# Patient Record
Sex: FEMALE | Race: WHITE | NOT HISPANIC OR LATINO | Employment: OTHER | URBAN - METROPOLITAN AREA
[De-identification: names, ages, dates, MRNs, and addresses within clinical notes are randomized per-mention and may not be internally consistent; named-entity substitution may affect disease eponyms.]

---

## 2019-12-12 ENCOUNTER — LAB (OUTPATIENT)
Dept: FAMILY MEDICINE CLINIC | Facility: CLINIC | Age: 59
End: 2019-12-12

## 2019-12-12 ENCOUNTER — OFFICE VISIT (OUTPATIENT)
Dept: FAMILY MEDICINE CLINIC | Facility: CLINIC | Age: 59
End: 2019-12-12

## 2019-12-12 VITALS
BODY MASS INDEX: 26.29 KG/M2 | DIASTOLIC BLOOD PRESSURE: 79 MMHG | HEIGHT: 64 IN | SYSTOLIC BLOOD PRESSURE: 115 MMHG | OXYGEN SATURATION: 99 % | WEIGHT: 154 LBS | HEART RATE: 90 BPM

## 2019-12-12 DIAGNOSIS — E61.1 IRON DEFICIENCY: ICD-10-CM

## 2019-12-12 DIAGNOSIS — I10 ESSENTIAL HYPERTENSION: ICD-10-CM

## 2019-12-12 DIAGNOSIS — G89.29 CHRONIC BILATERAL LOW BACK PAIN WITHOUT SCIATICA: ICD-10-CM

## 2019-12-12 DIAGNOSIS — M54.50 CHRONIC BILATERAL LOW BACK PAIN WITHOUT SCIATICA: ICD-10-CM

## 2019-12-12 DIAGNOSIS — I10 ESSENTIAL HYPERTENSION: Primary | ICD-10-CM

## 2019-12-12 PROBLEM — Z99.89 USE OF CANE AS AMBULATORY AID: Status: ACTIVE | Noted: 2019-12-12

## 2019-12-12 PROBLEM — J45.909 ASTHMA: Status: ACTIVE | Noted: 2019-12-12

## 2019-12-12 PROBLEM — F41.9 ANXIETY: Status: ACTIVE | Noted: 2019-12-12

## 2019-12-12 PROBLEM — L30.9 ECZEMA: Status: ACTIVE | Noted: 2019-12-12

## 2019-12-12 PROBLEM — Z12.39 BREAST CANCER SCREENING: Status: ACTIVE | Noted: 2019-09-12

## 2019-12-12 PROBLEM — J30.2 SEASONAL ALLERGIES: Status: ACTIVE | Noted: 2019-12-12

## 2019-12-12 PROBLEM — Z80.3 FAMILY HISTORY OF MALIGNANT NEOPLASM OF BREAST: Status: ACTIVE | Noted: 2019-03-22

## 2019-12-12 PROBLEM — R06.83 SNORING: Status: ACTIVE | Noted: 2019-12-12

## 2019-12-12 PROBLEM — F32.A DEPRESSION: Status: ACTIVE | Noted: 2019-12-12

## 2019-12-12 PROBLEM — R20.2 PARESTHESIAS IN LEFT HAND: Status: ACTIVE | Noted: 2018-08-16

## 2019-12-12 PROBLEM — G47.00 INSOMNIA: Status: ACTIVE | Noted: 2019-12-12

## 2019-12-12 PROBLEM — M54.30 SCIATICA: Status: ACTIVE | Noted: 2019-12-12

## 2019-12-12 PROBLEM — F81.9 LEARNING DISABILITY: Status: ACTIVE | Noted: 2019-12-12

## 2019-12-12 PROBLEM — G56.03 CARPAL TUNNEL SYNDROME, BILATERAL: Status: ACTIVE | Noted: 2018-10-16

## 2019-12-12 PROBLEM — M25.50 JOINT PAIN: Status: ACTIVE | Noted: 2018-10-16

## 2019-12-12 PROBLEM — D64.9 ANEMIA: Status: ACTIVE | Noted: 2019-12-12

## 2019-12-12 PROBLEM — H53.9 VISION ABNORMALITIES: Status: ACTIVE | Noted: 2019-12-12

## 2019-12-12 LAB
ALBUMIN SERPL-MCNC: 4.4 G/DL (ref 3.5–5.2)
ALBUMIN/GLOB SERPL: 1.7 G/DL
ALP SERPL-CCNC: 77 U/L (ref 39–117)
ALT SERPL W P-5'-P-CCNC: 19 U/L (ref 1–33)
ANION GAP SERPL CALCULATED.3IONS-SCNC: 14.1 MMOL/L (ref 5–15)
ANISOCYTOSIS BLD QL: ABNORMAL
AST SERPL-CCNC: 15 U/L (ref 1–32)
BASOPHILS # BLD MANUAL: 0.1 10*3/MM3 (ref 0–0.2)
BASOPHILS NFR BLD AUTO: 1.9 % (ref 0–1.5)
BILIRUB SERPL-MCNC: 0.2 MG/DL (ref 0.2–1.2)
BUN BLD-MCNC: 9 MG/DL (ref 8–23)
BUN/CREAT SERPL: 14.8 (ref 7–25)
CALCIUM SPEC-SCNC: 10.1 MG/DL (ref 8.6–10.5)
CHLORIDE SERPL-SCNC: 102 MMOL/L (ref 98–107)
CHOLEST SERPL-MCNC: 156 MG/DL (ref 0–200)
CO2 SERPL-SCNC: 27.9 MMOL/L (ref 22–29)
CREAT BLD-MCNC: 0.61 MG/DL (ref 0.57–1)
DEPRECATED RDW RBC AUTO: 36.7 FL (ref 37–54)
ELLIPTOCYTES BLD QL SMEAR: ABNORMAL
EOSINOPHIL # BLD MANUAL: 0.05 10*3/MM3 (ref 0–0.4)
EOSINOPHIL NFR BLD MANUAL: 0.9 % (ref 0.3–6.2)
ERYTHROCYTE [DISTWIDTH] IN BLOOD BY AUTOMATED COUNT: 15.2 % (ref 12.3–15.4)
GFR SERPL CREATININE-BSD FRML MDRD: 100 ML/MIN/1.73
GLOBULIN UR ELPH-MCNC: 2.6 GM/DL
GLUCOSE BLD-MCNC: 85 MG/DL (ref 65–99)
HCT VFR BLD AUTO: 38.8 % (ref 34–46.6)
HDLC SERPL-MCNC: 54 MG/DL (ref 40–60)
HGB BLD-MCNC: 12 G/DL (ref 12–15.9)
IRON 24H UR-MRATE: 55 MCG/DL (ref 37–145)
IRON SATN MFR SERPL: 18 % (ref 20–50)
LDLC SERPL CALC-MCNC: 74 MG/DL (ref 0–100)
LDLC/HDLC SERPL: 1.37 {RATIO}
LYMPHOCYTES # BLD MANUAL: 1.7 10*3/MM3 (ref 0.7–3.1)
LYMPHOCYTES NFR BLD MANUAL: 33 % (ref 19.6–45.3)
LYMPHOCYTES NFR BLD MANUAL: 5.7 % (ref 5–12)
MCH RBC QN AUTO: 21.3 PG (ref 26.6–33)
MCHC RBC AUTO-ENTMCNC: 30.9 G/DL (ref 31.5–35.7)
MCV RBC AUTO: 68.9 FL (ref 79–97)
MICROCYTES BLD QL: ABNORMAL
MONOCYTES # BLD AUTO: 0.29 10*3/MM3 (ref 0.1–0.9)
NEUTROPHILS # BLD AUTO: 3.02 10*3/MM3 (ref 1.7–7)
NEUTROPHILS NFR BLD MANUAL: 58.5 % (ref 42.7–76)
NRBC SPEC MANUAL: 0.9 /100 WBC (ref 0–0.2)
PLAT MORPH BLD: NORMAL
PLATELET # BLD AUTO: 210 10*3/MM3 (ref 140–450)
PMV BLD AUTO: 11 FL (ref 6–12)
POIKILOCYTOSIS BLD QL SMEAR: ABNORMAL
POTASSIUM BLD-SCNC: 3.6 MMOL/L (ref 3.5–5.2)
PROT SERPL-MCNC: 7 G/DL (ref 6–8.5)
RBC # BLD AUTO: 5.63 10*6/MM3 (ref 3.77–5.28)
SODIUM BLD-SCNC: 144 MMOL/L (ref 136–145)
TIBC SERPL-MCNC: 313 MCG/DL (ref 298–536)
TRANSFERRIN SERPL-MCNC: 210 MG/DL (ref 200–360)
TRIGL SERPL-MCNC: 141 MG/DL (ref 0–150)
VLDLC SERPL-MCNC: 28.2 MG/DL (ref 5–40)
WBC MORPH BLD: NORMAL
WBC NRBC COR # BLD: 5.16 10*3/MM3 (ref 3.4–10.8)

## 2019-12-12 PROCEDURE — 83540 ASSAY OF IRON: CPT | Performed by: NURSE PRACTITIONER

## 2019-12-12 PROCEDURE — 85007 BL SMEAR W/DIFF WBC COUNT: CPT | Performed by: NURSE PRACTITIONER

## 2019-12-12 PROCEDURE — 99204 OFFICE O/P NEW MOD 45 MIN: CPT | Performed by: NURSE PRACTITIONER

## 2019-12-12 PROCEDURE — 85025 COMPLETE CBC W/AUTO DIFF WBC: CPT | Performed by: NURSE PRACTITIONER

## 2019-12-12 PROCEDURE — 80061 LIPID PANEL: CPT | Performed by: NURSE PRACTITIONER

## 2019-12-12 PROCEDURE — 36415 COLL VENOUS BLD VENIPUNCTURE: CPT

## 2019-12-12 PROCEDURE — 80053 COMPREHEN METABOLIC PANEL: CPT | Performed by: NURSE PRACTITIONER

## 2019-12-12 PROCEDURE — 84466 ASSAY OF TRANSFERRIN: CPT | Performed by: NURSE PRACTITIONER

## 2019-12-12 RX ORDER — CYCLOBENZAPRINE HCL 10 MG
10 TABLET ORAL 3 TIMES DAILY PRN
Qty: 90 TABLET | Refills: 2 | Status: SHIPPED | OUTPATIENT
Start: 2019-12-12 | End: 2020-08-18 | Stop reason: SDUPTHER

## 2019-12-12 RX ORDER — TRAMADOL HYDROCHLORIDE 50 MG/1
50 TABLET ORAL EVERY 6 HOURS PRN
COMMUNITY

## 2019-12-12 RX ORDER — LISINOPRIL 10 MG/1
10 TABLET ORAL DAILY
Qty: 90 TABLET | Refills: 1 | Status: SHIPPED | OUTPATIENT
Start: 2019-12-12 | End: 2020-06-15 | Stop reason: SDUPTHER

## 2019-12-12 RX ORDER — EPINEPHRINE 0.3 MG/.3ML
INJECTION SUBCUTANEOUS
COMMUNITY
Start: 2017-08-04

## 2019-12-12 RX ORDER — DICLOFENAC SODIUM 75 MG/1
TABLET, DELAYED RELEASE ORAL
Qty: 180 TABLET | Refills: 0 | Status: SHIPPED | OUTPATIENT
Start: 2019-12-12 | End: 2020-08-18 | Stop reason: SDUPTHER

## 2019-12-12 RX ORDER — ALBUTEROL SULFATE 90 UG/1
2 AEROSOL, METERED RESPIRATORY (INHALATION) EVERY 4 HOURS PRN
COMMUNITY
Start: 2018-08-16

## 2019-12-12 RX ORDER — FERROUS SULFATE 325(65) MG
325 TABLET ORAL DAILY
COMMUNITY
Start: 2019-04-09

## 2019-12-12 RX ORDER — DICLOFENAC SODIUM 75 MG/1
1 TABLET, DELAYED RELEASE ORAL 2 TIMES DAILY
COMMUNITY
Start: 2019-07-18 | End: 2019-12-12 | Stop reason: SDUPTHER

## 2019-12-12 RX ORDER — GABAPENTIN 400 MG/1
1 CAPSULE ORAL 3 TIMES DAILY
COMMUNITY
Start: 2019-08-01 | End: 2019-12-12

## 2019-12-12 RX ORDER — FLUTICASONE PROPIONATE 50 MCG
2 SPRAY, SUSPENSION (ML) NASAL DAILY
COMMUNITY
Start: 2019-04-09

## 2019-12-12 RX ORDER — DICLOFENAC SODIUM 75 MG/1
75 TABLET, DELAYED RELEASE ORAL 2 TIMES DAILY
Qty: 60 TABLET | Refills: 5 | Status: SHIPPED | OUTPATIENT
Start: 2019-12-12 | End: 2019-12-12 | Stop reason: SDUPTHER

## 2019-12-12 RX ORDER — LISINOPRIL 10 MG/1
10 TABLET ORAL DAILY
COMMUNITY
Start: 2019-04-09 | End: 2019-12-12 | Stop reason: SDUPTHER

## 2019-12-12 RX ORDER — CYCLOBENZAPRINE HCL 10 MG
10 TABLET ORAL
COMMUNITY
Start: 2019-08-08 | End: 2019-12-12 | Stop reason: SDUPTHER

## 2019-12-12 RX ORDER — CETIRIZINE HYDROCHLORIDE 5 MG/1
5 TABLET ORAL DAILY
COMMUNITY

## 2019-12-12 RX ORDER — GABAPENTIN 100 MG/1
100 CAPSULE ORAL 3 TIMES DAILY
Qty: 120 CAPSULE | Refills: 1 | Status: SHIPPED | OUTPATIENT
Start: 2019-12-12

## 2019-12-12 NOTE — PROGRESS NOTES
Subjective   Kaley Rocha is a 59 y.o. female.     Pt is here today to establish care and with c/o fibromyalgia and osteoarthritis.  Pt is from [place] and recently moves to [place].  Previous PCP was Dr. Macedo and Dr. Malone in [place].  She had been established with pain management in the past- Dr. Rowell Learner on [address].  She has an appointment with Pain Management in January but lost the information as to who it was with.  Pt does not work at this time- she is disabled d/t fibromyalgia and osteoarthritis.  She is single, with a good support system.  She tries to get some physical activity in by walking, but doesn't as much as she believes she would.  She tries to eat a well balanced diet.    HTN- currently on 10mg lisinopril daily.  Tolerates the medication well without any side effects.  Denies any HA, SOA, dizziness.    Fibromyalgia- was taking gabapentin 400 mg TID and tramadol 50 mg TID.  Has been out of the pain medication for a couple of months. Would like to get back on gabapentin today.  Has appt with pain management in January    Osteoarthritis- Takes diclofenac 75 mg bid, flexeril 10 mg TID.  Most of her pain is in her back and into the lower body.  She has mild-moderate pain daily.  Does not have any aggrevating or alleviating symptoms.    Iron Deficiency anemia- has been oral iron in the past but has not been taking regularly.             Labs- due  Pap smear-utd- sees Dr. Ruben Taylor- has last Dec or Jan  Mammogram- utd   DEXA- due  Colonoscopy- due- last one was over 10 years- refused    Vaccines:  Flu- utd   PNA- not due yet  Shingles- due- refused  Tdap- utd 2018    Dental exam- due  Eye exam- utd    The following portions of the patient's history were reviewed and updated as appropriate: allergies, current medications, past family history, past medical history, past social history, past surgical history and problem list.    Review of Systems   Constitutional: Negative for  "appetite change, chills, fatigue and fever.   HENT: Negative for congestion, ear pain, hearing loss, postnasal drip, rhinorrhea, sinus pressure, sore throat, swollen glands and trouble swallowing.    Eyes: Negative for blurred vision, double vision, pain, discharge, itching and visual disturbance.   Respiratory: Negative for cough, chest tightness, shortness of breath and wheezing.    Cardiovascular: Negative for chest pain, palpitations and leg swelling.   Gastrointestinal: Negative for abdominal pain, blood in stool, constipation, diarrhea, nausea and vomiting.   Endocrine: Negative for polydipsia, polyphagia and polyuria.   Genitourinary: Negative for dysuria, flank pain, frequency and urgency.   Musculoskeletal: Positive for arthralgias, back pain and myalgias.   Skin: Negative for rash and skin lesions.   Neurological: Positive for numbness. Negative for dizziness, weakness and headache.   Psychiatric/Behavioral: Positive for sleep disturbance. Negative for depressed mood and stress. The patient is not nervous/anxious.        Objective   /79 (BP Location: Left arm, Patient Position: Sitting, Cuff Size: Adult)   Pulse 90   Ht 163.1 cm (64.2\")   Wt 69.9 kg (154 lb)   SpO2 99%   BMI 26.27 kg/m²   Physical Exam   Constitutional: She is oriented to person, place, and time. She appears well-developed and well-nourished. No distress.   HENT:   Head: Normocephalic and atraumatic.   Right Ear: External ear normal.   Left Ear: External ear normal.   Mouth/Throat: Oropharynx is clear and moist.   Eyes: Pupils are equal, round, and reactive to light. Conjunctivae and EOM are normal. Right eye exhibits no discharge. Left eye exhibits no discharge.   Neck: Normal range of motion. Neck supple.   Cardiovascular: Normal rate and regular rhythm.   Pulmonary/Chest: Effort normal and breath sounds normal. No respiratory distress. She has no wheezes. She has no rales.   Abdominal: Soft. Normal appearance and bowel sounds " are normal. She exhibits no distension. There is no tenderness.   Musculoskeletal: Normal range of motion.   Neurological: She is alert and oriented to person, place, and time.   Skin: Skin is warm and dry. She is not diaphoretic.   Psychiatric: She has a normal mood and affect. Her behavior is normal. Thought content normal.   Vitals reviewed.        Assessment/Plan     Diagnoses and all orders for this visit:    1. Essential hypertension (Primary)  Comments:  stable, cont lisinopril  Orders:  -     CBC & Differential  -     Comprehensive Metabolic Panel; Future  -     Lipid Panel; Future  -     lisinopril (PRINIVIL,ZESTRIL) 10 MG tablet; Take 1 tablet by mouth Daily.  Dispense: 90 tablet; Refill: 1    2. Chronic bilateral low back pain without sciatica  Comments:  stable, cont current meds  renew diclofenac  referral to PM  flexeril  start low dose of gabapentin TID  Orders:  -     Ambulatory Referral to Pain Management  -     diclofenac (VOLTAREN) 75 MG EC tablet; Take 1 tablet by mouth 2 (Two) Times a Day.  Dispense: 60 tablet; Refill: 5  -     cyclobenzaprine (FLEXERIL) 10 MG tablet; Take 1 tablet by mouth 3 (Three) Times a Day As Needed for Muscle Spasms.  Dispense: 90 tablet; Refill: 2  -     gabapentin (NEURONTIN) 100 MG capsule; Take 1 capsule by mouth 3 (Three) Times a Day.  Dispense: 120 capsule; Refill: 1    3. Iron deficiency  Comments:  check labs  Orders:  -     Iron and TIBC; Future

## 2019-12-12 NOTE — PATIENT INSTRUCTIONS
Follow up with pain management  Obtain labs  Continue current medication  Call for any questions or concerns  Work on diet and increasing exercise

## 2020-06-15 DIAGNOSIS — I10 ESSENTIAL HYPERTENSION: ICD-10-CM

## 2020-06-15 RX ORDER — LISINOPRIL 10 MG/1
10 TABLET ORAL DAILY
Qty: 90 TABLET | Refills: 1 | Status: SHIPPED | OUTPATIENT
Start: 2020-06-15 | End: 2020-07-29

## 2020-07-28 NOTE — PROGRESS NOTES
Subjective   Kaley Rocha is a 60 y.o. female.     Pt is here today to follow up on HTN and chronic pain.  She stays active on most days.    Walks frequently.    HTN- pt is currently taking lisinopril 10mg. Denies any dizziness or HA. BP is slightly low today. Reports that she has decreased stress now, so she believes that is why her BP is improved    Chronic pain - pt has osteoarthritis and fibromyalgia. She was previously referred to pain management. She is seeing a nurse practitioner at [facility].  Pt is on ultram 50mg bid, flexeril 10mg, and gabapentin 100mg tid.    Pts iron was slightly low last visit and she was advised to take an OTC iron supplement. She stopped the iron a couple of months ago.    Labs- due in 6 mo  Pap smear- scheduled in august  Mammogram-scheduled for august  DEXA-  Colonoscopy- had colonoscopy over 10 yrs ago- no polyps.  No 1st degree relative with colon cancer. Would like cologuard    Vaccines:  Flu- N/A  PNA- not due yet  Shingles- due- refused  Tdap- utd 2018    Dental exam- utd  Eye exam- utd         The following portions of the patient's history were reviewed and updated as appropriate: allergies, current medications, past family history, past medical history, past social history, past surgical history and problem list.    Review of Systems   Constitutional: Negative for appetite change, chills, fatigue and fever.   HENT: Negative for congestion, ear pain, hearing loss, postnasal drip, rhinorrhea, sinus pressure, sore throat, swollen glands and trouble swallowing.    Eyes: Negative for blurred vision, double vision, pain, discharge, itching and visual disturbance.   Respiratory: Negative for cough, chest tightness, shortness of breath and wheezing.    Cardiovascular: Negative for chest pain and palpitations.   Gastrointestinal: Negative for abdominal pain, blood in stool, constipation, diarrhea, nausea and vomiting.   Endocrine: Negative for polydipsia, polyphagia and  "polyuria.   Genitourinary: Negative for dysuria, flank pain, frequency and urgency.   Musculoskeletal: Positive for arthralgias and back pain. Negative for myalgias.   Skin: Negative for rash and skin lesions.   Neurological: Negative for dizziness, weakness, numbness and headache.   Psychiatric/Behavioral: Negative for sleep disturbance, suicidal ideas, depressed mood and stress. The patient is not nervous/anxious.        Objective   BP 98/62 (BP Location: Left arm, Patient Position: Sitting, Cuff Size: Adult)   Pulse 80   Temp 97.5 °F (36.4 °C) (Temporal)   Resp 16   Ht 163.1 cm (64.2\")   Wt 70.1 kg (154 lb 9.6 oz)   SpO2 100%   Breastfeeding No   BMI 26.37 kg/m²   Physical Exam   Constitutional: She is oriented to person, place, and time. She appears well-developed and well-nourished. No distress.   HENT:   Head: Normocephalic and atraumatic.   Eyes: Pupils are equal, round, and reactive to light. Conjunctivae and EOM are normal. Right eye exhibits no discharge. Left eye exhibits no discharge.   Neck: Normal range of motion. Neck supple.   Cardiovascular: Normal rate and regular rhythm.   Pulmonary/Chest: Effort normal and breath sounds normal. No respiratory distress. She has no wheezes. She has no rales.   Abdominal: Soft. Normal appearance and bowel sounds are normal. She exhibits no distension. There is no tenderness.   Musculoskeletal: Normal range of motion.   Neurological: She is alert and oriented to person, place, and time.   Skin: Skin is warm and dry. She is not diaphoretic.   Psychiatric: She has a normal mood and affect. Her behavior is normal. Thought content normal.   Vitals reviewed.        Assessment/Plan     Diagnoses and all orders for this visit:    1. Essential hypertension (Primary)  Comments:  BP low today  decrease lisinopril to 5mg daily  goal is 120/80  denies dizziness or HA    2. Chronic bilateral low back pain without sciatica  Comments:  stable  sees pain management    3. " Colon cancer screening  Comments:  cologuard is ordered    Orders:  -     Cologuard - Stool, Per Rectum; Future    Other orders  -     lisinopril (PRINIVIL,ZESTRIL) 5 MG tablet; Take 1 tablet by mouth Daily.  Dispense: 90 tablet; Refill: 0

## 2020-07-29 ENCOUNTER — OFFICE VISIT (OUTPATIENT)
Dept: FAMILY MEDICINE CLINIC | Facility: CLINIC | Age: 60
End: 2020-07-29

## 2020-07-29 ENCOUNTER — RESULTS ENCOUNTER (OUTPATIENT)
Dept: FAMILY MEDICINE CLINIC | Facility: CLINIC | Age: 60
End: 2020-07-29

## 2020-07-29 VITALS
TEMPERATURE: 97.5 F | BODY MASS INDEX: 26.4 KG/M2 | RESPIRATION RATE: 16 BRPM | WEIGHT: 154.6 LBS | DIASTOLIC BLOOD PRESSURE: 62 MMHG | HEART RATE: 80 BPM | OXYGEN SATURATION: 100 % | SYSTOLIC BLOOD PRESSURE: 98 MMHG | HEIGHT: 64 IN

## 2020-07-29 DIAGNOSIS — M54.50 CHRONIC BILATERAL LOW BACK PAIN WITHOUT SCIATICA: ICD-10-CM

## 2020-07-29 DIAGNOSIS — Z12.11 COLON CANCER SCREENING: ICD-10-CM

## 2020-07-29 DIAGNOSIS — G89.29 CHRONIC BILATERAL LOW BACK PAIN WITHOUT SCIATICA: ICD-10-CM

## 2020-07-29 DIAGNOSIS — I10 ESSENTIAL HYPERTENSION: Primary | ICD-10-CM

## 2020-07-29 PROCEDURE — 99213 OFFICE O/P EST LOW 20 MIN: CPT | Performed by: NURSE PRACTITIONER

## 2020-07-29 RX ORDER — LISINOPRIL 5 MG/1
5 TABLET ORAL DAILY
Qty: 90 TABLET | Refills: 0 | Status: SHIPPED | OUTPATIENT
Start: 2020-07-29 | End: 2020-10-12 | Stop reason: SDUPTHER

## 2020-07-29 NOTE — PATIENT INSTRUCTIONS
Decrease lisinopril to 5 mg daily.  Complete cologuard screening  Complete mammogram and pap smear  Follow up in 6 mo for labs  Cont to work on diet and exercise  Call for any issues or concerns

## 2020-08-18 DIAGNOSIS — G89.29 CHRONIC BILATERAL LOW BACK PAIN WITHOUT SCIATICA: ICD-10-CM

## 2020-08-18 DIAGNOSIS — M54.50 CHRONIC BILATERAL LOW BACK PAIN WITHOUT SCIATICA: ICD-10-CM

## 2020-08-18 RX ORDER — CYCLOBENZAPRINE HCL 10 MG
10 TABLET ORAL 3 TIMES DAILY PRN
Qty: 90 TABLET | Refills: 2 | Status: SHIPPED | OUTPATIENT
Start: 2020-08-18 | End: 2020-12-11 | Stop reason: SDUPTHER

## 2020-08-18 RX ORDER — DICLOFENAC SODIUM 75 MG/1
75 TABLET, DELAYED RELEASE ORAL 2 TIMES DAILY
Qty: 180 TABLET | Refills: 0 | Status: SHIPPED | OUTPATIENT
Start: 2020-08-18 | End: 2020-10-12

## 2020-10-12 DIAGNOSIS — G89.29 CHRONIC BILATERAL LOW BACK PAIN WITHOUT SCIATICA: ICD-10-CM

## 2020-10-12 DIAGNOSIS — M54.50 CHRONIC BILATERAL LOW BACK PAIN WITHOUT SCIATICA: ICD-10-CM

## 2020-10-12 RX ORDER — LISINOPRIL 5 MG/1
5 TABLET ORAL DAILY
Qty: 90 TABLET | Refills: 0 | Status: SHIPPED | OUTPATIENT
Start: 2020-10-12 | End: 2020-12-11 | Stop reason: SDUPTHER

## 2020-10-12 RX ORDER — DICLOFENAC SODIUM 75 MG/1
TABLET, DELAYED RELEASE ORAL
Qty: 180 TABLET | Refills: 0 | Status: SHIPPED | OUTPATIENT
Start: 2020-10-12 | End: 2020-12-11 | Stop reason: SDUPTHER

## 2020-12-11 DIAGNOSIS — G89.29 CHRONIC BILATERAL LOW BACK PAIN WITHOUT SCIATICA: ICD-10-CM

## 2020-12-11 DIAGNOSIS — M54.50 CHRONIC BILATERAL LOW BACK PAIN WITHOUT SCIATICA: ICD-10-CM

## 2020-12-11 RX ORDER — DICLOFENAC SODIUM 75 MG/1
75 TABLET, DELAYED RELEASE ORAL 2 TIMES DAILY
Qty: 180 TABLET | Refills: 0 | Status: SHIPPED | OUTPATIENT
Start: 2020-12-11

## 2020-12-11 RX ORDER — CYCLOBENZAPRINE HCL 10 MG
10 TABLET ORAL 3 TIMES DAILY PRN
Qty: 90 TABLET | Refills: 2 | Status: SHIPPED | OUTPATIENT
Start: 2020-12-11 | End: 2022-05-13

## 2020-12-11 RX ORDER — LISINOPRIL 5 MG/1
5 TABLET ORAL DAILY
Qty: 90 TABLET | Refills: 0 | Status: SHIPPED | OUTPATIENT
Start: 2020-12-11 | End: 2021-04-15

## 2021-01-15 ENCOUNTER — TELEPHONE (OUTPATIENT)
Dept: FAMILY MEDICINE CLINIC | Facility: CLINIC | Age: 61
End: 2021-01-15

## 2021-01-15 DIAGNOSIS — M79.642 PAIN IN BOTH HANDS: Primary | ICD-10-CM

## 2021-01-15 DIAGNOSIS — M79.641 PAIN IN BOTH HANDS: Primary | ICD-10-CM

## 2021-04-15 RX ORDER — LISINOPRIL 5 MG/1
5 TABLET ORAL DAILY
Qty: 90 TABLET | Refills: 0 | Status: SHIPPED | OUTPATIENT
Start: 2021-04-15 | End: 2021-05-11 | Stop reason: SDUPTHER

## 2021-05-10 NOTE — PROGRESS NOTES
Subjective   Kaley Rocha is a 61 y.o. female.     Pt is here for medication refills.  She doesn't want her blood pressure medication increased.  She is currently taking 2 of her lisinopril 5mg tablets daily. She has been under a lot of stress with loss of her mother.  h  Last mammogram- Had last year in [facility]  Last pap- 2years ago  Last colon cancer screening-About 20 years ago and doesn't want to do it at this time.  Last labs 12/2019 and were normal other than iron being on low side. Doesn't want to do labs at this time.       The following portions of the patient's history were reviewed and updated as appropriate: allergies, current medications, past family history, past medical history, past social history, past surgical history and problem list.  Past Medical History:   Diagnosis Date   • Arthritis    • Fibromyalgia    • Hypertension      No past surgical history on file.  Family History   Problem Relation Age of Onset   • Cancer Mother    • Breast cancer Mother    • Hypertension Mother    • Hypertension Father    • Diabetes Brother    • Hypertension Brother    • Heart attack Brother    • Cancer Maternal Grandmother    • Cancer Paternal Grandfather      Social History     Socioeconomic History   • Marital status: Single     Spouse name: Not on file   • Number of children: Not on file   • Years of education: Not on file   • Highest education level: Not on file   Tobacco Use   • Smoking status: Former Smoker   • Smokeless tobacco: Never Used   • Tobacco comment: smoked occasionally, but quit 2 yrs ago   Substance and Sexual Activity   • Alcohol use: Yes     Alcohol/week: 2.0 standard drinks     Types: 2 Glasses of wine per week   • Drug use: Never   • Sexual activity: Yes     Partners: Male         Current Outpatient Medications:   •  albuterol sulfate  (90 Base) MCG/ACT inhaler, Inhale 2 puffs Every 4 (Four) Hours As Needed for Wheezing., Disp: , Rfl:   •   "cetirizine (zyrTEC) 5 MG tablet, Take 5 mg by mouth Daily., Disp: , Rfl:   •  cyclobenzaprine (FLEXERIL) 10 MG tablet, Take 1 tablet by mouth 3 (Three) Times a Day As Needed for Muscle Spasms., Disp: 90 tablet, Rfl: 2  •  diclofenac (VOLTAREN) 75 MG EC tablet, Take 1 tablet by mouth 2 (Two) Times a Day., Disp: 180 tablet, Rfl: 0  •  EPINEPHrine (EPIPEN 2-JOSELUIS) 0.3 MG/0.3ML solution auto-injector injection, INJECT 0.3 MG BY INTRAMUSCULAR ROUTE ONCE AS NEEDED FOR ANAPHYLAXIS, DISPENSE TWIN PACK, Disp: , Rfl:   •  ferrous sulfate 325 (65 FE) MG tablet, Take 325 mg by mouth Daily., Disp: , Rfl:   •  fluticasone (FLONASE) 50 MCG/ACT nasal spray, 2 sprays into the nostril(s) as directed by provider Daily., Disp: , Rfl:   •  gabapentin (NEURONTIN) 100 MG capsule, Take 1 capsule by mouth 3 (Three) Times a Day., Disp: 120 capsule, Rfl: 1  •  lisinopril (PRINIVIL,ZESTRIL) 5 MG tablet, TAKE 1 TABLET BY MOUTH DAILY, Disp: 90 tablet, Rfl: 0  •  traMADol (ULTRAM) 50 MG tablet, Take 50 mg by mouth Every 6 (Six) Hours As Needed for Pain., Disp: , Rfl:     Review of Systems   Constitutional: Negative for activity change, appetite change, chills, diaphoresis, fatigue, fever, unexpected weight gain and unexpected weight loss.   Eyes: Negative for blurred vision, double vision and visual disturbance.   Respiratory: Negative for chest tightness and shortness of breath.    Cardiovascular: Negative for chest pain, palpitations and leg swelling.   Neurological: Negative for dizziness, weakness, light-headedness, headache and confusion.   Psychiatric/Behavioral: Positive for depressed mood and stress. Negative for self-injury, sleep disturbance and suicidal ideas. The patient is nervous/anxious.      /84 (BP Location: Left arm, Patient Position: Sitting, Cuff Size: Adult)   Pulse 104   Temp 97.5 °F (36.4 °C) (Skin)   Ht 162.6 cm (64\")   SpO2 96%   BMI 26.54 kg/m²       Objective   Physical Exam  Vitals and nursing note reviewed. "   Constitutional:       General: She is not in acute distress.     Appearance: Normal appearance. She is normal weight.   HENT:      Head: Normocephalic and atraumatic.   Neck:      Thyroid: No thyroid mass, thyromegaly or thyroid tenderness.   Cardiovascular:      Rate and Rhythm: Normal rate and regular rhythm.      Heart sounds: Normal heart sounds. No murmur heard.     Pulmonary:      Effort: Pulmonary effort is normal. No respiratory distress.      Breath sounds: Normal breath sounds. No wheezing, rhonchi or rales.   Musculoskeletal:      Cervical back: Normal range of motion and neck supple.      Right lower leg: No edema.      Left lower leg: No edema.   Skin:     General: Skin is warm and dry.   Neurological:      General: No focal deficit present.      Mental Status: She is alert and oriented to person, place, and time.   Psychiatric:         Mood and Affect: Mood is anxious.         Behavior: Behavior normal.         Thought Content: Thought content normal.         Procedures     Assessment/Plan   Diagnoses and all orders for this visit:    1. Essential hypertension (Primary)  -     lisinopril (PRINIVIL,ZESTRIL) 10 MG tablet; Take 1 tablet by mouth Daily.  Dispense: 90 tablet; Refill: 3      Pt politely declines blood work at this time.  She also doesn't want to do any colon cancer screening tests at this time.  Encouraged pt to work on low sodium diet and continue walking for exercise.  Encouraged therapy or stress reduction also.  She will monitor her blood pressure and let us know if staying elevated. We discussed the risks of having high blood pressure not well controlled.

## 2021-05-11 ENCOUNTER — OFFICE VISIT (OUTPATIENT)
Dept: FAMILY MEDICINE CLINIC | Facility: CLINIC | Age: 61
End: 2021-05-11

## 2021-05-11 VITALS
BODY MASS INDEX: 26.54 KG/M2 | DIASTOLIC BLOOD PRESSURE: 84 MMHG | HEART RATE: 104 BPM | SYSTOLIC BLOOD PRESSURE: 161 MMHG | OXYGEN SATURATION: 96 % | TEMPERATURE: 97.5 F | HEIGHT: 64 IN

## 2021-05-11 DIAGNOSIS — I10 ESSENTIAL HYPERTENSION: Primary | ICD-10-CM

## 2021-05-11 PROCEDURE — 99213 OFFICE O/P EST LOW 20 MIN: CPT | Performed by: PHYSICIAN ASSISTANT

## 2021-05-11 RX ORDER — LISINOPRIL 10 MG/1
10 TABLET ORAL DAILY
Qty: 90 TABLET | Refills: 3 | Status: SHIPPED | OUTPATIENT
Start: 2021-05-11 | End: 2022-06-01 | Stop reason: SDUPTHER

## 2021-05-11 NOTE — PATIENT INSTRUCTIONS
Work on low sodium diet and exercise.  Continue lisinopril.  Will send higher dose of 10mg to pharmacy.  Monitor blood pressure and let us know if staying elevated.

## 2021-09-23 ENCOUNTER — TELEPHONE (OUTPATIENT)
Dept: FAMILY MEDICINE CLINIC | Facility: CLINIC | Age: 61
End: 2021-09-23

## 2021-09-23 NOTE — TELEPHONE ENCOUNTER
I would recommend pfizer as it is now FDA approved. Next would be Moderna and then Philip and Philip. Truly I am fine with any though.

## 2021-09-23 NOTE — TELEPHONE ENCOUNTER
Caller: Kaley Rocha    Relationship: Self    Best call back number: [phone]    Who are you requesting to speak with (clinical staff, provider,  specific staff member): NURSE    What was the call regarding:     PATIENT IS NEEDING TO KNOW WHICH COVID VACCINE IS THE SAFEST.    Do you require a callback:YES

## 2021-09-24 NOTE — TELEPHONE ENCOUNTER
Id say 1000mg of Vit D and whatever the over the counter dose of zinc is. That's not one I typically prescribe

## 2021-09-27 ENCOUNTER — TELEPHONE (OUTPATIENT)
Dept: FAMILY MEDICINE CLINIC | Facility: CLINIC | Age: 61
End: 2021-09-27

## 2021-09-27 NOTE — TELEPHONE ENCOUNTER
Let her know we typically advise waiting until 5 days after exposure before being tested unless she develops symptoms. She ca schedule appt to get tested here, pr she can go to a little clinic or the [address] [place] [address] grounds

## 2021-09-27 NOTE — TELEPHONE ENCOUNTER
PATIENT HAS BEEN EXPOSED TO  COVID AND WANTS TO GET TESTED. I TRIED TO TRANSFER BUT WAS UNSUCCESSFUL. SHE IS NERVOUS BECAUSE SHE HAS A FAMILY MEMBER IN THE HOSPITAL WITH COVID.    PLEASE ADVISE  [phone]

## 2021-12-06 ENCOUNTER — TELEPHONE (OUTPATIENT)
Dept: FAMILY MEDICINE CLINIC | Facility: CLINIC | Age: 61
End: 2021-12-06

## 2021-12-06 DIAGNOSIS — M79.641 PAIN IN BOTH HANDS: Primary | ICD-10-CM

## 2021-12-06 DIAGNOSIS — M79.642 PAIN IN BOTH HANDS: Primary | ICD-10-CM

## 2021-12-06 NOTE — TELEPHONE ENCOUNTER
Caller: Kaley Rocha    Relationship: Self    Best call back number: [phone] (H)    What is the medical concern/diagnosis: ARTHRITIS IN HANDS     What specialty or service is being requested: RHEUMATOLOGIST    What is the provider, practice or medical service name: PATIENT STATES ANYONE THAT HER PCP THINKS IS GOOD FOR HER    What is the office location: N/A    What is the office phone number:N/A    Any additional details: PLEASE ADVISE

## 2022-05-12 DIAGNOSIS — G89.29 CHRONIC BILATERAL LOW BACK PAIN WITHOUT SCIATICA: ICD-10-CM

## 2022-05-12 DIAGNOSIS — M54.50 CHRONIC BILATERAL LOW BACK PAIN WITHOUT SCIATICA: ICD-10-CM

## 2022-05-13 RX ORDER — CYCLOBENZAPRINE HCL 10 MG
TABLET ORAL
Qty: 90 TABLET | Refills: 0 | Status: SHIPPED | OUTPATIENT
Start: 2022-05-13

## 2022-06-01 DIAGNOSIS — I10 ESSENTIAL HYPERTENSION: ICD-10-CM

## 2022-06-01 RX ORDER — LISINOPRIL 10 MG/1
10 TABLET ORAL DAILY
Qty: 30 TABLET | Refills: 0 | Status: SHIPPED | OUTPATIENT
Start: 2022-06-01 | End: 2022-07-06 | Stop reason: SDUPTHER

## 2022-06-01 NOTE — TELEPHONE ENCOUNTER
Caller: Kaley Rocha    Relationship: Self    Best call back number: [phone]    Requested Prescriptions:   Requested Prescriptions     Pending Prescriptions Disp Refills   • lisinopril (PRINIVIL,ZESTRIL) 10 MG tablet 90 tablet 3     Sig: Take 1 tablet by mouth Daily.        Pharmacy where request should be sent: [id number] [address] - [phone] [facility] [phone] FX     Additional details provided by patient: PATIENT STATES SHE HAS AN APPOINTMENT TO CHECK BLOOD PRESSURE AND MEDICATION ON 6/16/22, BUT ONLY HAS 9 TABLETS REMAINING, AND WILL NEED A REFILL BEFORE THAT APPOINTMENT.    Does the patient have less than a 3 day supply:  [] Yes  [x] No    Kelly Regalado Rep   06/01/22 15:18 EDT

## 2022-06-22 ENCOUNTER — TELEPHONE (OUTPATIENT)
Dept: FAMILY MEDICINE CLINIC | Facility: CLINIC | Age: 62
End: 2022-06-22

## 2022-06-22 NOTE — TELEPHONE ENCOUNTER
Caller: Kaley Rocha    Relationship to patient: Self    Best call back number: [phone]    Patient is needing: PATIENT WAS TRYING TO CANCEL AN APPOINTMENT FOR TODAY TO [facility] AND HASN'T BEEN ABLE TO GET THROUGH.  SHE WOULD LIKE TO KNOW IF YOU CAN GET THROUGH FOR HER.  IF NOT PLEASE CALL.

## 2022-06-28 ENCOUNTER — TELEPHONE (OUTPATIENT)
Dept: FAMILY MEDICINE CLINIC | Facility: CLINIC | Age: 62
End: 2022-06-28

## 2022-06-28 NOTE — TELEPHONE ENCOUNTER
Caller: Kaley Rocha    Relationship: Self    Best call back number:[phone]      What is the best time to reach you:  ANY TIME     Who are you requesting to speak with (clinical staff, provider,  specific staff member): CLINICAL    What was the call regarding: PATIENT STATES THAT SHE FEELS FINE, BUT HAS CONGESTION, COUGHING UP CLEAR STUFF SINCE 6/21.  PATIENT WOULD LIKE A CALLBACK TO ADVISE HER OF WHAT ARE THE SYMPTOMS OF COVID  IF SHE SHOULD TAKE A HOME TEST, AND WHAT STEPS SHE SHOULD TAKE TO TREAT HER SYMPTOMS.   PATIENT WOULD LIKE SOMETHING CALLED IN TO TREAT HER SYMPTOMS.   PLEASE CALL PATIENT TO ADVISE.    Pharmacy:   [facility] [address] [place] [address] [phone] [place] [phone] FX    Do you require a callback: YES

## 2022-06-28 NOTE — TELEPHONE ENCOUNTER
This could be symptoms of COVID, but she is likely out of the quarantine zone.  She can always take a home COVID test.  She could try taking some Mucinex over-the-counter and using Flonase nasal spray.  If those do not help she can follow-up in office   86 yo female hx of HTN/HLD/Parkinson BIBA 2/2 nausea/lower abdominal pain and syncope. as per patient, she was walking to bathroom with her daughter. She started feeling lightheaded and almost passed so her called EMS. denies fever/chill/HA/dizziness/chest pain/sob/upper abd pain/ vomiting/ diarrhea/ black and bloody stool and urinary sxs.

## 2022-07-05 ENCOUNTER — TELEPHONE (OUTPATIENT)
Dept: FAMILY MEDICINE CLINIC | Facility: CLINIC | Age: 62
End: 2022-07-05

## 2022-07-05 NOTE — TELEPHONE ENCOUNTER
Spoke with patient and she is going to try to have someone take her to get tested. No fever and she feels okay just having the diarrhea now and a little headache. Told her to update us.

## 2022-07-05 NOTE — TELEPHONE ENCOUNTER
Pt was been having congestion for 10-14 days and was told to take mucinex and nasal spray.     Pt is now battling with headache,diarrhea.  She is planning on testing for covid this afternoon.

## 2022-07-06 ENCOUNTER — TELEPHONE (OUTPATIENT)
Dept: FAMILY MEDICINE CLINIC | Facility: CLINIC | Age: 62
End: 2022-07-06

## 2022-07-06 DIAGNOSIS — I10 ESSENTIAL HYPERTENSION: ICD-10-CM

## 2022-07-06 RX ORDER — LISINOPRIL 10 MG/1
10 TABLET ORAL DAILY
Qty: 30 TABLET | Refills: 0 | Status: SHIPPED | OUTPATIENT
Start: 2022-07-06 | End: 2022-07-14 | Stop reason: SDUPTHER

## 2022-07-06 NOTE — TELEPHONE ENCOUNTER
SEE IF THEY MAIL THIS TO HER   HAS APPT NEXT WEEK       Caller:     Best call back number:     Kaley Rocha (Self) [phone] (H)       Requested Prescriptions:   Requested Prescriptions     Pending Prescriptions Disp Refills   • lisinopril (PRINIVIL,ZESTRIL) 10 MG tablet 30 tablet 0     Sig: Take 1 tablet by mouth Daily.        Pharmacy where request should be sent: [place] [phone] [place] [phone] FX     Additional details provided by patient:     Does the patient have less than a 3 day supply:  [x] Yes  [] No    Kelly Nguyen   07/06/22 13:17 EDT

## 2022-07-14 ENCOUNTER — OFFICE VISIT (OUTPATIENT)
Dept: FAMILY MEDICINE CLINIC | Facility: CLINIC | Age: 62
End: 2022-07-14

## 2022-07-14 ENCOUNTER — LAB (OUTPATIENT)
Dept: FAMILY MEDICINE CLINIC | Facility: CLINIC | Age: 62
End: 2022-07-14

## 2022-07-14 VITALS
OXYGEN SATURATION: 99 % | TEMPERATURE: 97.1 F | HEART RATE: 93 BPM | BODY MASS INDEX: 27.12 KG/M2 | SYSTOLIC BLOOD PRESSURE: 136 MMHG | DIASTOLIC BLOOD PRESSURE: 85 MMHG | WEIGHT: 158 LBS

## 2022-07-14 DIAGNOSIS — Z00.00 PREVENTATIVE HEALTH CARE: ICD-10-CM

## 2022-07-14 DIAGNOSIS — G89.29 CHRONIC BILATERAL LOW BACK PAIN WITHOUT SCIATICA: ICD-10-CM

## 2022-07-14 DIAGNOSIS — I10 ESSENTIAL HYPERTENSION: Primary | ICD-10-CM

## 2022-07-14 DIAGNOSIS — I10 ESSENTIAL HYPERTENSION: ICD-10-CM

## 2022-07-14 DIAGNOSIS — M54.50 CHRONIC BILATERAL LOW BACK PAIN WITHOUT SCIATICA: ICD-10-CM

## 2022-07-14 LAB
ALBUMIN SERPL-MCNC: 4.6 G/DL (ref 3.5–5.2)
ALBUMIN/GLOB SERPL: 2.1 G/DL
ALP SERPL-CCNC: 80 U/L (ref 39–117)
ALT SERPL W P-5'-P-CCNC: 12 U/L (ref 1–33)
ANION GAP SERPL CALCULATED.3IONS-SCNC: 11.8 MMOL/L (ref 5–15)
AST SERPL-CCNC: 18 U/L (ref 1–32)
BASOPHILS # BLD MANUAL: 0.06 10*3/MM3 (ref 0–0.2)
BASOPHILS NFR BLD MANUAL: 1.1 % (ref 0–1.5)
BILIRUB SERPL-MCNC: 0.4 MG/DL (ref 0–1.2)
BUN SERPL-MCNC: 10 MG/DL (ref 8–23)
BUN/CREAT SERPL: 14.3 (ref 7–25)
CALCIUM SPEC-SCNC: 10 MG/DL (ref 8.6–10.5)
CHLORIDE SERPL-SCNC: 104 MMOL/L (ref 98–107)
CHOLEST SERPL-MCNC: 149 MG/DL (ref 0–200)
CO2 SERPL-SCNC: 24.2 MMOL/L (ref 22–29)
CREAT SERPL-MCNC: 0.7 MG/DL (ref 0.57–1)
DEPRECATED RDW RBC AUTO: 36 FL (ref 37–54)
EGFRCR SERPLBLD CKD-EPI 2021: 97.3 ML/MIN/1.73
EOSINOPHIL # BLD MANUAL: 0.21 10*3/MM3 (ref 0–0.4)
EOSINOPHIL NFR BLD MANUAL: 3.9 % (ref 0.3–6.2)
ERYTHROCYTE [DISTWIDTH] IN BLOOD BY AUTOMATED COUNT: 15.7 % (ref 12.3–15.4)
GLOBULIN UR ELPH-MCNC: 2.2 GM/DL
GLUCOSE SERPL-MCNC: 79 MG/DL (ref 65–99)
HCT VFR BLD AUTO: 38.2 % (ref 34–46.6)
HCV AB SER DONR QL: NORMAL
HDLC SERPL-MCNC: 60 MG/DL (ref 40–60)
HGB BLD-MCNC: 11.8 G/DL (ref 12–15.9)
LDLC SERPL CALC-MCNC: 66 MG/DL (ref 0–100)
LDLC/HDLC SERPL: 1.04 {RATIO}
LYMPHOCYTES # BLD MANUAL: 1.52 10*3/MM3 (ref 0.7–3.1)
LYMPHOCYTES NFR BLD MANUAL: 6.6 % (ref 5–12)
MCH RBC QN AUTO: 20.7 PG (ref 26.6–33)
MCHC RBC AUTO-ENTMCNC: 30.9 G/DL (ref 31.5–35.7)
MCV RBC AUTO: 66.9 FL (ref 79–97)
MONOCYTES # BLD: 0.35 10*3/MM3 (ref 0.1–0.9)
NEUTROPHILS # BLD AUTO: 3.16 10*3/MM3 (ref 1.7–7)
NEUTROPHILS NFR BLD MANUAL: 59.7 % (ref 42.7–76)
PLAT MORPH BLD: NORMAL
PLATELET # BLD AUTO: 254 10*3/MM3 (ref 140–450)
PMV BLD AUTO: 10.4 FL (ref 6–12)
POTASSIUM SERPL-SCNC: 4.2 MMOL/L (ref 3.5–5.2)
PROT SERPL-MCNC: 6.8 G/DL (ref 6–8.5)
RBC # BLD AUTO: 5.71 10*6/MM3 (ref 3.77–5.28)
RBC MORPH BLD: NORMAL
SODIUM SERPL-SCNC: 140 MMOL/L (ref 136–145)
TRIGL SERPL-MCNC: 134 MG/DL (ref 0–150)
VARIANT LYMPHS NFR BLD MANUAL: 28.7 % (ref 19.6–45.3)
VLDLC SERPL-MCNC: 23 MG/DL (ref 5–40)
WBC MORPH BLD: NORMAL
WBC NRBC COR # BLD: 5.3 10*3/MM3 (ref 3.4–10.8)

## 2022-07-14 PROCEDURE — 99214 OFFICE O/P EST MOD 30 MIN: CPT | Performed by: NURSE PRACTITIONER

## 2022-07-14 PROCEDURE — 80053 COMPREHEN METABOLIC PANEL: CPT | Performed by: NURSE PRACTITIONER

## 2022-07-14 PROCEDURE — 80061 LIPID PANEL: CPT | Performed by: NURSE PRACTITIONER

## 2022-07-14 PROCEDURE — 85025 COMPLETE CBC W/AUTO DIFF WBC: CPT | Performed by: NURSE PRACTITIONER

## 2022-07-14 PROCEDURE — 85007 BL SMEAR W/DIFF WBC COUNT: CPT | Performed by: NURSE PRACTITIONER

## 2022-07-14 PROCEDURE — 36415 COLL VENOUS BLD VENIPUNCTURE: CPT

## 2022-07-14 PROCEDURE — 86803 HEPATITIS C AB TEST: CPT | Performed by: NURSE PRACTITIONER

## 2022-07-14 RX ORDER — LISINOPRIL 10 MG/1
10 TABLET ORAL DAILY
Qty: 90 TABLET | Refills: 3 | Status: SHIPPED | OUTPATIENT
Start: 2022-07-14

## 2022-07-14 RX ORDER — LISINOPRIL 10 MG/1
10 TABLET ORAL DAILY
Qty: 30 TABLET | Refills: 0 | Status: SHIPPED | OUTPATIENT
Start: 2022-07-14 | End: 2022-07-14

## 2022-07-14 NOTE — PROGRESS NOTES
Subjective     Kaley Rocha is a 62 y.o. female.     Pt is here today to follow up on HTN and chronic pain.  She states she is doing well overall.  Walks frequently.  Tries to eat a well balanced diet.      HTN- pt is currently taking lisinopril 10mg. Denies CP, SOA, dizziness, HA.      Chronic pain - pt has osteoarthritis and fibromyalgia. She was previously referred to pain management. She is seeing a nurse practitioner at [facility].  Pt is on ultram 50mg bid, flexeril 10mg, and gabapentin 100mg tid. She is doing well on the medication.         Labs- due   Pap smear- sees GYN  Mammogram-due- will get at GYN  Colonoscopy- had colonoscopy over 10 yrs ago- no polyps.  No 1st degree relative with colon cancer. refused     Vaccines:  Flu- N/A  PNA- refused  Shingles- due- refused  Tdap- utd 2018     Dental exam- utd  Eye exam- utd       The following portions of the patient's history were reviewed and updated as appropriate: allergies, current medications, past family history, past medical history, past social history, past surgical history and problem list.    Review of Systems   Constitutional: Negative for chills, fatigue and fever.   Respiratory: Negative for chest tightness and shortness of breath.    Cardiovascular: Negative for chest pain and palpitations.   Gastrointestinal: Negative for abdominal pain, nausea and vomiting.   Genitourinary: Negative for dysuria and frequency.   Musculoskeletal: Positive for arthralgias.   Neurological: Negative for dizziness and headache.   Psychiatric/Behavioral: Negative for depressed mood and stress. The patient is not nervous/anxious.        Objective     /85 (BP Location: Left arm, Patient Position: Sitting, Cuff Size: Adult)   Pulse 93   Temp 97.1 °F (36.2 °C) (Tympanic)   Wt 71.7 kg (158 lb)   SpO2 99%   BMI 27.12 kg/m²     Current Outpatient Medications on File Prior to Visit   Medication Sig Dispense Refill   • albuterol sulfate  (90 Base)  MCG/ACT inhaler Inhale 2 puffs Every 4 (Four) Hours As Needed for Wheezing.     • cetirizine (zyrTEC) 5 MG tablet Take 5 mg by mouth Daily.     • cyclobenzaprine (FLEXERIL) 10 MG tablet Take 1 tablet by mouth three times daily as needed for muscle spasm 90 tablet 0   • diclofenac (VOLTAREN) 75 MG EC tablet Take 1 tablet by mouth 2 (Two) Times a Day. 180 tablet 0   • EPINEPHrine (EPIPEN 2-JOSELUIS) 0.3 MG/0.3ML solution auto-injector injection INJECT 0.3 MG BY INTRAMUSCULAR ROUTE ONCE AS NEEDED FOR ANAPHYLAXIS, DISPENSE TWIN PACK     • ferrous sulfate 325 (65 FE) MG tablet Take 325 mg by mouth Daily.     • fluticasone (FLONASE) 50 MCG/ACT nasal spray 2 sprays into the nostril(s) as directed by provider Daily.     • gabapentin (NEURONTIN) 100 MG capsule Take 1 capsule by mouth 3 (Three) Times a Day. 120 capsule 1   • traMADol (ULTRAM) 50 MG tablet Take 50 mg by mouth Every 6 (Six) Hours As Needed for Pain.     • [DISCONTINUED] lisinopril (PRINIVIL,ZESTRIL) 10 MG tablet Take 1 tablet by mouth Daily. 30 tablet 0     No current facility-administered medications on file prior to visit.        Physical Exam  Vitals reviewed.   Constitutional:       General: She is not in acute distress.     Appearance: Normal appearance. She is well-developed. She is not diaphoretic.   HENT:      Head: Normocephalic and atraumatic.   Eyes:      General:         Right eye: No discharge.         Left eye: No discharge.      Extraocular Movements: Extraocular movements intact.      Conjunctiva/sclera: Conjunctivae normal.   Cardiovascular:      Rate and Rhythm: Normal rate and regular rhythm.      Heart sounds: No murmur heard.  Pulmonary:      Effort: Pulmonary effort is normal. No respiratory distress.      Breath sounds: Normal breath sounds. No wheezing or rales.   Abdominal:      General: Bowel sounds are normal.      Palpations: Abdomen is soft.   Musculoskeletal:         General: Normal range of motion.      Cervical back: Normal range of  motion.   Skin:     General: Skin is warm and dry.   Neurological:      General: No focal deficit present.      Mental Status: She is alert and oriented to person, place, and time.   Psychiatric:         Mood and Affect: Mood normal.         Behavior: Behavior normal.         Thought Content: Thought content normal.         Judgment: Judgment normal.           Assessment & Plan     Diagnoses and all orders for this visit:    1. Essential hypertension (Primary)  Comments:  stable  cont med  work on diet and exercise  check labs  Orders:  -     Comprehensive Metabolic Panel; Future  -     Lipid Panel; Future  -     Discontinue: lisinopril (PRINIVIL,ZESTRIL) 10 MG tablet; Take 1 tablet by mouth Daily.  Dispense: 30 tablet; Refill: 0  -     CBC & Differential  -     lisinopril (PRINIVIL,ZESTRIL) 10 MG tablet; Take 1 tablet by mouth Daily.  Dispense: 90 tablet; Refill: 3    2. Preventative health care  Comments:  work on diet and exercise  check labs  sees GYN- wants them to order mammo  refuses colon cancer screening    Orders:  -     Comprehensive Metabolic Panel; Future  -     Lipid Panel; Future  -     Hepatitis C Antibody; Future  -     CBC & Differential    3. Chronic bilateral low back pain without sciatica  Comments:  sees pain management  stable at this time

## 2022-07-18 ENCOUNTER — TELEPHONE (OUTPATIENT)
Dept: FAMILY MEDICINE CLINIC | Facility: CLINIC | Age: 62
End: 2022-07-18

## 2022-07-18 NOTE — TELEPHONE ENCOUNTER
Caller: Kaley Rocha    Relationship to patient: Self    Best call back number: [phone]    Patient is needing: PATIENT HAD LABS DONE RECENTLY AND WAS TOLD SHE MIGHT NEED TO TAKE A IRON SUPPLEMENT OR AN ADDITIONAL WOMEN'S VITAMIN THAT HAS HIGHER IRON SUPPLEMENT.    PATIENT WOULD LIKE A CALL TO DISCUSS WETHER  IS GOING TO PRESCRIBE HER SOMETHING OR IF SHE RECOMMENDS PATIENT TO GET AN OVER THE COUNTER WOMEN'S VITAMIN SUPPLEMENT FROM THE LOCAL PHARMACY.    PATIENT LEFT VOICEMAIL FOR  EARLIER, PATIENT WOULD NOT MIND SPEAKING TO THE NURSE IF NEEDED.    PLEASE CONTACT PATIENT AS SOON AS POSSIBLE.

## 2022-07-18 NOTE — TELEPHONE ENCOUNTER
I believe I answered this in a result note to be called back to the patient. Please refer to that.

## 2022-07-22 ENCOUNTER — PATIENT ROUNDING (BHMG ONLY) (OUTPATIENT)
Dept: FAMILY MEDICINE CLINIC | Facility: CLINIC | Age: 62
End: 2022-07-22

## 2022-07-22 ENCOUNTER — TELEPHONE (OUTPATIENT)
Dept: FAMILY MEDICINE CLINIC | Facility: CLINIC | Age: 62
End: 2022-07-22

## 2022-07-22 NOTE — PROGRESS NOTES
A My-Chart message has been sent to the patient for PATIENT ROUNDING with [facility].

## 2022-07-22 NOTE — TELEPHONE ENCOUNTER
Caller: Kaley Rocha    Relationship to patient: Self    Best call back number: [phone]    Patient is needing: PATIENT STATES THAT SHE NEEDS THE BILL FROM HER LABS ON 7/14/22 TO BE FAXED OR SENT OVER TO HER INSURANCE SO THAT SHE CAN RECEIVE HER CERTIFICATE FOR $100 FOR HER PHARMACY.PATIENT IS REQUESTING A CALL BACK.

## 2022-07-27 ENCOUNTER — TELEPHONE (OUTPATIENT)
Dept: FAMILY MEDICINE CLINIC | Facility: CLINIC | Age: 62
End: 2022-07-27

## 2022-07-27 DIAGNOSIS — L98.9 SKIN LESION: Primary | ICD-10-CM

## 2022-07-27 NOTE — TELEPHONE ENCOUNTER
Caller: Kaley Rocha    Relationship: Self    Best call back number: [phone]    What is the medical concern/diagnosis: SPOTS ON NECK    What specialty or service is being requested:DERMATOLOGY     What is the provider, practice or medical service name: [facility]

## 2022-07-28 NOTE — TELEPHONE ENCOUNTER
Fax referral and called patient no answer so left voice mail letting patient know that I fax the referral for her and she can call or wait for Monique Mej[facility] office to call       Hub can read

## 2022-10-28 ENCOUNTER — TELEPHONE (OUTPATIENT)
Dept: FAMILY MEDICINE CLINIC | Facility: CLINIC | Age: 62
End: 2022-10-28

## 2022-10-28 NOTE — TELEPHONE ENCOUNTER
Caller: Kaley Rocha    Relationship: Self    Best call back number: [phone]    What was the call regarding: PATIENT STATES HER INSURANCE TOLD HER THAT [facility] WILL NOT CHANGE HER VISIT FROM 7/14 TO A MEDICARE WELLNESS VISIT. PLEASE CALL PATIENT TO ADVISE WHY IT CAN NOT BE CHANGED. SHE DOESN'T UNDERSTAND WHAT THE DIFFERENCE IS BETWEEN WHAT SHE HAD DONE AT THE VISIT VERSUS A MEDICARE WELLNESS VISIT AND WOULD LIKE TO GO OVER IT WITH SOMEONE SO SHE UNDERSTANDS FOR NEXT TIME.     Do you require a callback: YES

## 2022-10-31 NOTE — TELEPHONE ENCOUNTER
Would you be able to explain the difference to the patient due to all the questionaires that need to be asked for a medicare wellness to be covered?

## 2022-12-06 ENCOUNTER — TELEPHONE (OUTPATIENT)
Dept: FAMILY MEDICINE CLINIC | Facility: CLINIC | Age: 62
End: 2022-12-06

## 2022-12-06 NOTE — TELEPHONE ENCOUNTER
Patient notified   Radiation Oncology Follow-Up    Patient Name:  Jed Rollins  YOB: 1940  MRN:  [id number]  Date of Service: 5/26/2022   Referring Physician:  No ref. provider found  Dictating Physician:  Tegan Romeo MD    Diagnosis:     Lung cancer    TREATMENT RENDERED:    Radiation Treatments     Active   right lung (Started on 12/6/2021)   Most recent fraction: 200 cGy given on 1/11/2022   Total given: 4,800 cGy / 6,000 cGy  (24 of 30 fractions)   Elapsed Days: 36   Technique: 3-FIELD         Historical   Rt Lung cone down (Started on 1/12/2022)   Most recent fraction: 200 cGy given on 1/19/2022   Total given: 1,200 cGy / 1,200 cGy  (6 of 6 fractions)   Elapsed Days: 7   Technique: 3-FIELD                   History of Present Illness:    Jed Rollins is a 81 year old year old male.  who under went pre-op clearance for a left gluteal cyst surgery. On CXR 10/22/2021 he was found to have Relatively well-circumscribed, lobulated 9.4 x 9.7 x 6.7 cm masslike density right upper lung field demonstrated above and in the area of the minor fissure new compared to 01/06/2017.  Malignancy is the primaryconsideration.  Other processes not excluded.     CT chest 10/28/2021 showed diffuse emphysema and a large mass in the RUL measuring 9 cm as well as an enlarged right peribronchial lymph nodes measuring 1.3 cm in short axis and an enlarged left peribronchial and aortic perivascular lymph node 1.2 cm.      PET/CT 11/8/2021   1. Metabolic activity within the large partially necrotic/cystic mass in  the right lung consistent with malignancy. The mass is located mostly in  the posterior right upper lobe and extends into the superior segment of the  right lower lobe. Portion of the mass abuts the right hilum. Portion of the  mass at least abuts the right minor fissure and may focally extend into the  superior portion of the right middle lobe.  2. No evidence of distant metastases.  3. Cardiomegaly. Aortic valve  right lung volume loss, with slight  rightward mediastinal shift. Findings likely represent evolving changes of  postradiation pneumonitis/early fibrosis. Superimposed infectious etiology  not entirely excluded and continued follow-up is advised.   *    Roughly stable right upper lobe mass compared to PET/CT on 3/21/2022  and CT chest on 3/2/2022.   *   Other chronic findings as above.    Was initially requiring O2 only with ambulation and was started on prednisone 80mg on 3/29.     Completed prednisone on 5/23/2022 and feels worse since being off. On 6L  oxygen    Patient is here for a routine follow-up.    REVIEW OF SYSTEMS:   Constitutional: negative  for fever, chills, weight loss, weight gain, fatigue, night sweats  Skin: Negative for rash   HEENT: negative for diplopia, blurry vision, dry eye, sore throat or neck pain.  Respiratory: negative for cough, hemoptysis or shortness of breath   Cardiovascular: Negative for chest pain, palpitations or diaphoresis.  Gastrointestinal: Negative for nausea, vomiting, diarrhea, constipation, abdominal pain, blood in stools  Genitourinary: Negative for frequency, urgency, dysuria, hematuria or flank pain.  Musculoskeletal  Negative for joint pain or swelling. Back pain  Neurologic:  Negative for headache, change in gait, vertigo, vision or speech. Negative for change in sensory or motor function.    Hematological: Negative for bleeding, bruising or lymphadenopathy.  Psychiatric: Negative for change in affect, anxiety, depression, mentation or sleep disturbance.    PHYSICAL EXAM:   The Karnofsky performance scale today is 70, Cares for self; unable to carry on normal activity or to do active work (ECOG equivalent 1).     Vitals:    05/26/22 0906   BP: 121/69   BP Location: LUE - Left upper extremity   Patient Position: Sitting   Cuff Size: Regular   Pulse: 79   Resp: 20   Temp: 97.6 °F (36.4 °C)   TempSrc: Tympanic   SpO2: 93%   Weight: 99.7 kg (219 lb 12.8 oz)   PainSc:  0    PainLoc: Chest   He was 85% when he first walked into the clinic and then came down     General:   Alert, oriented X3 and in no apparent distress  Respiratory:   Decreased breath sounds in all lung fields      IMPRESSION:    NSCLC, squamous cell histology.  T4 N0 (there are small mediastinal nodes but they are not PET avid and less than 1 cm) M0  stage IIIA  S/p chemo-RT  After 1 cycle of durvalumab had significant clinical  Pneumonitis. He was on prednisone for almost 2 months and since stopping feels significantly short of breath. On 6L O2 continuously and desaturations with ambulation.     PLAN    Prednisone 10mg BID.   Will inform Dr Leblanc re steroids to see if he needs anything for glucose monitoring as it was above 400 at Dr Ochoa office before.   They will call me on the telephone  next week to let me know how he is doing clinically.   CTs first week of June.   Patient should return to clinic in 3 weeks.   Discussed with Dr Dotson    Total time spent with patient:  >25 minutes  including reviewing records, face-to-face time, documentation, and coordinating care      >50% of the total time was spent counseling and/or coordinating care.    Note to patient: The 21st Century Cures Act makes medical notes like these available to patients in the interest of transparency. However, be advised this is a medical document. It is intended as peer to peer communication. It is written in medical language and may contain abbreviations or verbiage that are unfamiliar. It may appear blunt or direct. Medical documents are intended to carry relevant information, facts as evident, and the clinical opinion of the practitioner.    Tegan Romeo MD  5/26/2022

## 2022-12-06 NOTE — TELEPHONE ENCOUNTER
She needs to be pushing fluid intake. She can try using mouthwash that can help with dry mouth. It could be from drainage as well. She could try flonase nasal spray to help with drainage. Make appt if no improvement

## 2022-12-06 NOTE — TELEPHONE ENCOUNTER
Caller: Kaley Rocha    Relationship to patient: Self    Best call back number: [phone]    Patient is needing: SHE ADVISED THAT SHE HAS HAD A DRY THROAT AND CAN'T GET RID OF IT. SHE WOULD LIKE TO KNOW WHAT SHE NEEDS TO BE DOING FROM HERE.

## 2022-12-13 ENCOUNTER — TELEPHONE (OUTPATIENT)
Dept: FAMILY MEDICINE CLINIC | Facility: CLINIC | Age: 62
End: 2022-12-13

## 2022-12-13 NOTE — TELEPHONE ENCOUNTER
Caller: Kaley Rocha    Relationship: Self    Best call back number: [phone]    What is the best time to reach you: ANYTIME    Who are you requesting to speak with (clinical staff, provider,  specific staff member): CLINICAL STAFF    Do you know the name of the person who called: PATIENT     What was the call regarding: PATIENT SAID SHE JUST GOT OVER THE FLU, AND IS WANTING TO SEE IF SHE CAN GO AHEAD AND GET THE NEW COVID-19 BOOSTER, OR IF SHE NEEDS TO WAIT A WHILE     Do you require a callback: YES

## 2022-12-15 ENCOUNTER — TELEPHONE (OUTPATIENT)
Dept: FAMILY MEDICINE CLINIC | Facility: CLINIC | Age: 62
End: 2022-12-15

## 2022-12-15 NOTE — TELEPHONE ENCOUNTER
Spoke with patient and she is feeling better, she doesn't think its thrush and the burning has stopped. She will make appt if she is feeling bad.

## 2022-12-15 NOTE — TELEPHONE ENCOUNTER
Patient called and and she hasn't been feeling well, she did what she was told about her dry mouth but its not helping. States its been dry and burning, any other suggestions then whats over the counter because that's not doing anything? She had a little chill last night and stomach alittle upset.

## 2022-12-15 NOTE — TELEPHONE ENCOUNTER
I would advise her to try to get an appt so we can assess her to determine next course of treatment. Does she have a white film on her tongue? I wonder if it could be thrush. I hate to treat that without seeing it.

## 2022-12-15 NOTE — TELEPHONE ENCOUNTER
Caller: Bonilla Argueta    Relationship: Self    Best call back number: [phone]     What is the best time to reach you: ASAP    Who are you requesting to speak with (clinical staff, provider,  specific staff member): [facility] OR HER MA    Do you know the name of the person who called: BONILLA ARGUETA    What was the call regarding: SYMPTOMS    Do you require a callback: YES

## 2023-04-17 ENCOUNTER — TELEPHONE (OUTPATIENT)
Dept: FAMILY MEDICINE CLINIC | Facility: CLINIC | Age: 63
End: 2023-04-17
Payer: MEDICARE

## 2023-04-17 DIAGNOSIS — F32.A DEPRESSION, UNSPECIFIED DEPRESSION TYPE: Primary | ICD-10-CM

## 2023-04-17 NOTE — TELEPHONE ENCOUNTER
"  Caller: Kaley Rocha \"JAYME\"    Relationship: Self    Best call back number: [phone]    What was the call regarding: PATIENT WOULD LIKE TO SPEAK WITH [facility] OR HER NURSE REGARDING A FUTURE REFERRAL.    SHE STATED THIS IS VERY IMPORTANT.    PLEASE CALL.    Do you require a callback:   "
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
## 2023-04-21 ENCOUNTER — TELEPHONE (OUTPATIENT)
Dept: FAMILY MEDICINE CLINIC | Facility: CLINIC | Age: 63
End: 2023-04-21
Payer: MEDICARE

## 2023-04-21 NOTE — TELEPHONE ENCOUNTER
"Caller: Kaley Rocha \"JAYME\"    Relationship: Self    Best call back number: [phone]    PLEASE REFERENCE 4/17 ENCOUNTER    PATIENT WOULD LIKE A CALL TO DISCUSS WHO SHE IS BEING REFERRED TO FOR BEHAVIORAL HEALTH. SHE WOULD ALSO LIKE TO KNOW IF SHE CAN HAVE A COPY OF THE ORDER TO RUN THROUGH MEDICAID.      "

## 2023-04-26 ENCOUNTER — TELEPHONE (OUTPATIENT)
Dept: FAMILY MEDICINE CLINIC | Facility: CLINIC | Age: 63
End: 2023-04-26

## 2023-04-26 NOTE — TELEPHONE ENCOUNTER
"    Caller: Kaley Rocha \"JAYME\"    Relationship to patient: Self    Best call back number: [phone]    Patient is needing:     PATIENT RECEIVED A REFERRAL TO [facility], HOWEVER IT WAS GOING TO BE TOO FAR OUT TO GET HER IN.     WOULD LIKE TO HAVE A NEW REFERRAL SENT TO WHERE [facility] THINKS WOULD BE GOOD. [facility]?    ALSO HAS NOT RECEIVED HER MENTAL HEALTH PACKET AND WAS WONDERING WHEN SHE COULD EXPECT THAT.    WOULD LIKE A CALL BACK TO SPEAK WITH [place].               "

## 2023-05-08 NOTE — PROGRESS NOTES
Subjective     Kaley Rocha is a 63 y.o. female.     History of Present Illness  Patient is here today with complaints of congestion and cough.  She states that it started on Friday or Saturday.  She doesn't feel sick.  She had tried taking sudafed.  Denies fever.  She states she is coughing clear sputum.  Her nose is quite congested.  She has also tried vicks inhaler  She has tried Afrin.       The following portions of the patient's history were reviewed and updated as appropriate: allergies, current medications, past family history, past medical history, past social history, past surgical history and problem list.    Review of Systems   Constitutional: Negative for chills, fatigue and fever.   HENT: Positive for congestion and sinus pressure. Negative for ear pain and sore throat.    Respiratory: Positive for cough. Negative for chest tightness and shortness of breath.    Cardiovascular: Negative for chest pain and palpitations.   Neurological: Negative for dizziness and headache.       Objective     /78 (BP Location: Left arm, Patient Position: Sitting, Cuff Size: Adult)   Pulse 112   Temp 98.4 °F (36.9 °C) (Tympanic)   Wt 69.9 kg (154 lb)   SpO2 98%   BMI 26.43 kg/m²     Current Outpatient Medications on File Prior to Visit   Medication Sig Dispense Refill   • albuterol sulfate  (90 Base) MCG/ACT inhaler Inhale 2 puffs Every 4 (Four) Hours As Needed for Wheezing.     • cetirizine (zyrTEC) 5 MG tablet Take 5 mg by mouth Daily.     • cyclobenzaprine (FLEXERIL) 10 MG tablet Take 1 tablet by mouth three times daily as needed for muscle spasm 90 tablet 0   • diclofenac (VOLTAREN) 75 MG EC tablet Take 1 tablet by mouth 2 (Two) Times a Day. 180 tablet 0   • EPINEPHrine (EPIPEN 2-JOSELUIS) 0.3 MG/0.3ML solution auto-injector injection INJECT 0.3 MG BY INTRAMUSCULAR ROUTE ONCE AS NEEDED FOR ANAPHYLAXIS, DISPENSE TWIN PACK     • ferrous sulfate 325 (65 FE) MG tablet Take 325 mg by mouth Daily.     •  gabapentin (NEURONTIN) 100 MG capsule Take 1 capsule by mouth 3 (Three) Times a Day. 120 capsule 1   • lisinopril (PRINIVIL,ZESTRIL) 10 MG tablet Take 1 tablet by mouth Daily. 90 tablet 3   • traMADol (ULTRAM) 50 MG tablet Take 50 mg by mouth Every 6 (Six) Hours As Needed for Pain.     • [DISCONTINUED] fluticasone (FLONASE) 50 MCG/ACT nasal spray 2 sprays into the nostril(s) as directed by provider Daily.       No current facility-administered medications on file prior to visit.        Physical Exam  Constitutional:       Appearance: Normal appearance. She is not ill-appearing.   HENT:      Head: Normocephalic and atraumatic.      Right Ear: Tympanic membrane and ear canal normal.      Left Ear: Tympanic membrane and ear canal normal.      Nose: Congestion present.      Mouth/Throat:      Pharynx: No oropharyngeal exudate or posterior oropharyngeal erythema.   Cardiovascular:      Rate and Rhythm: Normal rate and regular rhythm.      Heart sounds: No murmur heard.  Pulmonary:      Effort: Pulmonary effort is normal. No respiratory distress.      Breath sounds: Normal breath sounds.   Skin:     General: Skin is warm and dry.   Neurological:      General: No focal deficit present.      Mental Status: She is alert and oriented to person, place, and time.   Psychiatric:         Mood and Affect: Mood normal.         Behavior: Behavior normal.         Thought Content: Thought content normal.         Judgment: Judgment normal.           Assessment & Plan     Diagnoses and all orders for this visit:    1. Sinus congestion (Primary)  Comments:  appears to be viral or allergies  cont sudafed prn  start flonase  tessalon perles as needed  call if no improvement  Orders:  -     fluticasone (FLONASE) 50 MCG/ACT nasal spray; 2 sprays into the nostril(s) as directed by provider Daily.  Dispense: 9.9 mL; Refill: 0    2. Acute cough  Comments:  appears to be viral or allergies  cont sudafed prn  start flonase  tessalon perles as  needed  call if no improvement  Orders:  -     benzonatate (Tessalon Perles) 100 MG capsule; Take 1 capsule by mouth 3 (Three) Times a Day As Needed for Cough.  Dispense: 30 capsule; Refill: 0

## 2023-05-09 ENCOUNTER — OFFICE VISIT (OUTPATIENT)
Dept: FAMILY MEDICINE CLINIC | Facility: CLINIC | Age: 63
End: 2023-05-09
Payer: MEDICARE

## 2023-05-09 VITALS
WEIGHT: 154 LBS | HEART RATE: 112 BPM | OXYGEN SATURATION: 98 % | BODY MASS INDEX: 26.43 KG/M2 | TEMPERATURE: 98.4 F | SYSTOLIC BLOOD PRESSURE: 130 MMHG | DIASTOLIC BLOOD PRESSURE: 78 MMHG

## 2023-05-09 DIAGNOSIS — R05.1 ACUTE COUGH: ICD-10-CM

## 2023-05-09 DIAGNOSIS — R09.81 SINUS CONGESTION: Primary | ICD-10-CM

## 2023-05-09 PROCEDURE — 3078F DIAST BP <80 MM HG: CPT | Performed by: NURSE PRACTITIONER

## 2023-05-09 PROCEDURE — 99213 OFFICE O/P EST LOW 20 MIN: CPT | Performed by: NURSE PRACTITIONER

## 2023-05-09 PROCEDURE — 1159F MED LIST DOCD IN RCRD: CPT | Performed by: NURSE PRACTITIONER

## 2023-05-09 PROCEDURE — 3075F SYST BP GE 130 - 139MM HG: CPT | Performed by: NURSE PRACTITIONER

## 2023-05-09 PROCEDURE — 1160F RVW MEDS BY RX/DR IN RCRD: CPT | Performed by: NURSE PRACTITIONER

## 2023-05-09 RX ORDER — BENZONATATE 100 MG/1
100 CAPSULE ORAL 3 TIMES DAILY PRN
Qty: 30 CAPSULE | Refills: 0 | Status: SHIPPED | OUTPATIENT
Start: 2023-05-09

## 2023-05-09 RX ORDER — FLUTICASONE PROPIONATE 50 MCG
2 SPRAY, SUSPENSION (ML) NASAL DAILY
Qty: 9.9 ML | Refills: 0 | Status: SHIPPED | OUTPATIENT
Start: 2023-05-09

## 2023-05-09 NOTE — PATIENT INSTRUCTIONS
Start flonase nasal spray daily  Sudafed as needed  Push water intake  Take tylenol or ibuprofen for fever or discomfort  Get plenty of rest  Call if no improvement or worsening symptoms

## 2023-08-31 DIAGNOSIS — I10 ESSENTIAL HYPERTENSION: ICD-10-CM

## 2023-09-01 ENCOUNTER — TELEPHONE (OUTPATIENT)
Dept: FAMILY MEDICINE CLINIC | Facility: CLINIC | Age: 63
End: 2023-09-01

## 2023-09-01 DIAGNOSIS — I10 ESSENTIAL HYPERTENSION: ICD-10-CM

## 2023-09-01 RX ORDER — LISINOPRIL 10 MG/1
TABLET ORAL
Qty: 30 TABLET | Refills: 0 | Status: SHIPPED | OUTPATIENT
Start: 2023-09-01 | End: 2023-09-01 | Stop reason: SDUPTHER
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
## 2023-09-04 RX ORDER — LISINOPRIL 10 MG/1
10 TABLET ORAL DAILY
Qty: 90 TABLET | Refills: 0 | Status: SHIPPED | OUTPATIENT
Start: 2023-09-04

## 2023-11-14 ENCOUNTER — OFFICE VISIT (OUTPATIENT)
Dept: FAMILY MEDICINE CLINIC | Facility: CLINIC | Age: 63
End: 2023-11-14
Payer: MEDICARE

## 2023-11-14 VITALS
SYSTOLIC BLOOD PRESSURE: 122 MMHG | BODY MASS INDEX: 26.69 KG/M2 | HEIGHT: 65 IN | WEIGHT: 160.2 LBS | DIASTOLIC BLOOD PRESSURE: 68 MMHG | HEART RATE: 99 BPM | OXYGEN SATURATION: 97 % | TEMPERATURE: 98.8 F

## 2023-11-14 DIAGNOSIS — F32.A DEPRESSION, UNSPECIFIED DEPRESSION TYPE: ICD-10-CM

## 2023-11-14 DIAGNOSIS — M79.7 FIBROMYALGIA: ICD-10-CM

## 2023-11-14 DIAGNOSIS — G89.29 CHRONIC BILATERAL LOW BACK PAIN WITHOUT SCIATICA: ICD-10-CM

## 2023-11-14 DIAGNOSIS — M54.50 CHRONIC BILATERAL LOW BACK PAIN WITHOUT SCIATICA: ICD-10-CM

## 2023-11-14 DIAGNOSIS — I10 ESSENTIAL HYPERTENSION: Primary | ICD-10-CM

## 2023-11-14 DIAGNOSIS — D50.9 IRON DEFICIENCY ANEMIA, UNSPECIFIED IRON DEFICIENCY ANEMIA TYPE: ICD-10-CM

## 2023-11-14 DIAGNOSIS — F41.9 ANXIETY: ICD-10-CM

## 2023-11-14 DIAGNOSIS — E55.9 VITAMIN D DEFICIENCY: ICD-10-CM

## 2023-11-14 RX ORDER — LISINOPRIL 10 MG/1
10 TABLET ORAL DAILY
Qty: 90 TABLET | Refills: 2 | Status: SHIPPED | OUTPATIENT
Start: 2023-11-14

## 2023-11-14 RX ORDER — MULTIPLE VITAMINS W/ MINERALS TAB 9MG-400MCG
1 TAB ORAL DAILY
COMMUNITY

## 2023-11-14 NOTE — PROGRESS NOTES
Chief Complaint  Establish Care and Hypertension    Subjective          JAYME presents to [facility]   as a 63-year-old female to establish care and to follow-up on hypertension, medication refills, order labs.  Overall doing okay    History of hypertension has been controlled on lisinopril 10 mg p.o. daily.  She denies any medication side effects.  She does not regularly check her blood pressure at home.  She denies any headaches any blurred vision any dizziness any flushing any chest pain or pressure    She does have a history of chronic back, neck, shoulder pain.  She does have fibromyalgia  She does see pain management and has follow-up appointment scheduled with them.  She does take gabapentin and tramadol as ordered  She feels this is controlling her symptoms well  She would like referral to see a chiropractor.  She prefers [facility].    She recently moved here from [place]  Has a history of anxiety/depression/PTSD  She feels she is doing much better in a different environment.  She declines any need for any medication today.  She is not currently in counseling.  She denies any SI or HI    PHQ-2 Depression Screening  Little interest or pleasure in doing things? 0-->not at all   Feeling down, depressed, or hopeless? 0-->not at all   PHQ-2 Total Score 0       History of anemia.  She does take ferrous sulfate 325 mg p.o. daily.  No changes in signs and symptoms      She is not fasting today and will return for labs    She has no other acute complaints of    The following portions of the patient's history were reviewed and updated as appropriate: allergies, current medications, past family history, past medical history, past social history, past surgical history, and problem list          Review of Systems   Constitutional:  Negative for chills, fatigue and fever.   Eyes:  Negative for visual disturbance.   Respiratory:  Negative for cough, shortness of breath and wheezing.   "  Cardiovascular:  Negative for chest pain, palpitations and leg swelling.   Gastrointestinal:  Negative for abdominal pain, diarrhea, nausea and vomiting.   Endocrine: Negative for polydipsia, polyphagia and polyuria.   Musculoskeletal:  Positive for arthralgias, back pain and neck pain.   Skin:  Negative for rash.   Neurological:  Negative for dizziness and light-headedness.   Psychiatric/Behavioral:  Negative for self-injury, sleep disturbance and suicidal ideas. The patient is not nervous/anxious.         Objective   Vital Signs:   Vitals:    11/14/23 1000   BP: 122/68   Pulse: 99   Temp: 98.8 °F (37.1 °C)   TempSrc: Oral   SpO2: 97%   Weight: 72.7 kg (160 lb 3.2 oz)   Height: 165.1 cm (65\")                Physical Exam  Vitals reviewed.   Constitutional:       General: She is not in acute distress.  Eyes:      Conjunctiva/sclera: Conjunctivae normal.   Neck:      Thyroid: No thyromegaly.      Vascular: No carotid bruit.   Cardiovascular:      Rate and Rhythm: Normal rate and regular rhythm.      Heart sounds: Normal heart sounds.   Pulmonary:      Effort: Pulmonary effort is normal. No respiratory distress.      Breath sounds: Normal breath sounds. No stridor. No wheezing, rhonchi or rales.   Chest:      Chest wall: No tenderness.   Lymphadenopathy:      Cervical: No cervical adenopathy.   Neurological:      Mental Status: She is alert and oriented to person, place, and time.   Psychiatric:         Attention and Perception: Attention normal.         Mood and Affect: Mood normal.          Result Review :     The following data was reviewed by: HUSSANI Vitale on 11/14/2023:      Manual Differential (07/14/2022 10:02)  Hepatitis C Antibody (07/14/2022 10:02)  Lipid Panel (07/14/2022 10:02)  Comprehensive Metabolic Panel (07/14/2022 10:02)  CBC & Differential (07/14/2022 10:02)   Hemoglobin is slightly low.  I want her to make sure she is taking a daily iron supplement at this time   Assessment and Plan  "   Diagnoses and all orders for this visit:    1. Essential hypertension (Primary)  Comments:  stable  cont med  work on diet and exercise  check labs  Orders:  -     lisinopril (PRINIVIL,ZESTRIL) 10 MG tablet; Take 1 tablet by mouth Daily.  Dispense: 90 tablet; Refill: 2  -     Comprehensive Metabolic Panel  -     CBC & Differential  -     Lipid Panel  -     T4, Free  -     TSH  -     Vitamin D,25-Hydroxy    2. Chronic bilateral low back pain without sciatica  Comments:  Keep follow-up with pain management  Referral to chiropractor per patient request  Prefers [facility]  Orders:  -     Cancel: Ambulatory Referral to Chiropractic  -     Ambulatory Referral to Chiropractic    3. Vitamin D deficiency  Comments:  Recheck vitamin D with labs  Orders:  -     Vitamin D,25-Hydroxy    4. Anxiety  Comments:  Controlled  Improved with new environment  Recommend continuing counseling  Declines need for medication    5. Depression, unspecified depression type  Comments:  Improved with change of environment  Denies any SI or HI  Declines need for medication    6. Fibromyalgia  Comments:  No changes  Keep follow-up with pain management    7. Iron deficiency anemia, unspecified iron deficiency anemia type  Comments:  Repeat H&H with labs  Continue ferrous sulfate as directed        Follow Up   Return in about 6 months (around 5/14/2024) for Medicare Wellness.  Patient was given instructions and counseling regarding her condition or for health maintenance advice. Please see specific information pulled into the AVS if appropriate.